# Patient Record
Sex: FEMALE | Race: WHITE | URBAN - METROPOLITAN AREA
[De-identification: names, ages, dates, MRNs, and addresses within clinical notes are randomized per-mention and may not be internally consistent; named-entity substitution may affect disease eponyms.]

---

## 2022-09-08 ENCOUNTER — WEB ENCOUNTER (OUTPATIENT)
Dept: URBAN - METROPOLITAN AREA CLINIC 128 | Facility: CLINIC | Age: 47
End: 2022-09-08

## 2022-09-08 ENCOUNTER — OFFICE VISIT (OUTPATIENT)
Dept: URBAN - METROPOLITAN AREA CLINIC 128 | Facility: CLINIC | Age: 47
End: 2022-09-08
Payer: COMMERCIAL

## 2022-09-08 VITALS
WEIGHT: 132 LBS | SYSTOLIC BLOOD PRESSURE: 108 MMHG | HEART RATE: 59 BPM | TEMPERATURE: 97.9 F | DIASTOLIC BLOOD PRESSURE: 68 MMHG | BODY MASS INDEX: 20.72 KG/M2 | HEIGHT: 67 IN

## 2022-09-08 DIAGNOSIS — K64.9 HEMORRHOIDS: ICD-10-CM

## 2022-09-08 DIAGNOSIS — K58.9 IBS (IRRITABLE BOWEL SYNDROME): ICD-10-CM

## 2022-09-08 DIAGNOSIS — Z12.11 COLON CANCER SCREENING: ICD-10-CM

## 2022-09-08 PROCEDURE — 99203 OFFICE O/P NEW LOW 30 MIN: CPT | Performed by: INTERNAL MEDICINE

## 2022-09-08 RX ORDER — POLYETHYLENE GLYOCOL 3350, SODIUM CHLORIDE, SODIUM BICARBONATE AND POTASSIUM CHLORIDE 420; 11.2; 5.72; 1.48 G/4L; G/4L; G/4L; G/4L
AS DIRECTED POWDER, FOR SOLUTION NASOGASTRIC; ORAL
Qty: 1 | Refills: 0 | OUTPATIENT
Start: 2022-09-08 | End: 2022-09-09

## 2022-09-08 NOTE — HPI-TODAY'S VISIT:
patient presents for initial colonoscopy. in midst of w/u for chest pain palpitations, abnormal ekg with cards. will request cards clearance. reports h/o hemorhoids with intermittent bleeding constipation diarrhea and gas pains over past couple of years. admits lots of stress. .

## 2022-09-30 ENCOUNTER — OFFICE VISIT (OUTPATIENT)
Dept: URBAN - METROPOLITAN AREA SURGERY CENTER 31 | Facility: SURGERY CENTER | Age: 47
End: 2022-09-30

## 2022-10-14 ENCOUNTER — OFFICE VISIT (OUTPATIENT)
Dept: URBAN - METROPOLITAN AREA SURGERY CENTER 31 | Facility: SURGERY CENTER | Age: 47
End: 2022-10-14
Payer: COMMERCIAL

## 2022-10-14 ENCOUNTER — WEB ENCOUNTER (OUTPATIENT)
Age: 47
End: 2022-10-14

## 2022-10-14 DIAGNOSIS — Z12.11 COLON CANCER SCREENING: ICD-10-CM

## 2022-10-14 PROCEDURE — 45378 DIAGNOSTIC COLONOSCOPY: CPT | Performed by: INTERNAL MEDICINE

## 2022-10-14 PROCEDURE — G8907 PT DOC NO EVENTS ON DISCHARG: HCPCS | Performed by: INTERNAL MEDICINE

## 2022-10-14 RX ORDER — HYDROCORTISONE 25 MG/G
1 APPLICATION CREAM TOPICAL TWICE A DAY
Qty: 42 APPLICATOR | Refills: 0 | OUTPATIENT
Start: 2022-10-14

## 2022-10-24 ENCOUNTER — TELEPHONE ENCOUNTER (OUTPATIENT)
Dept: URBAN - METROPOLITAN AREA CLINIC 128 | Facility: CLINIC | Age: 47
End: 2022-10-24

## 2023-01-13 ENCOUNTER — OFFICE VISIT (OUTPATIENT)
Dept: URBAN - METROPOLITAN AREA CLINIC 128 | Facility: CLINIC | Age: 48
End: 2023-01-13

## 2023-02-23 ENCOUNTER — WEB ENCOUNTER (OUTPATIENT)
Dept: URBAN - METROPOLITAN AREA CLINIC 128 | Facility: CLINIC | Age: 48
End: 2023-02-23

## 2023-02-24 ENCOUNTER — OFFICE VISIT (OUTPATIENT)
Dept: URBAN - METROPOLITAN AREA CLINIC 128 | Facility: CLINIC | Age: 48
End: 2023-02-24
Payer: COMMERCIAL

## 2023-02-24 ENCOUNTER — DASHBOARD ENCOUNTERS (OUTPATIENT)
Age: 48
End: 2023-02-24

## 2023-02-24 VITALS
HEART RATE: 61 BPM | HEIGHT: 67 IN | WEIGHT: 132 LBS | TEMPERATURE: 98.2 F | DIASTOLIC BLOOD PRESSURE: 78 MMHG | BODY MASS INDEX: 20.72 KG/M2 | SYSTOLIC BLOOD PRESSURE: 113 MMHG

## 2023-02-24 DIAGNOSIS — R19.7 DIARRHEA: ICD-10-CM

## 2023-02-24 DIAGNOSIS — K64.9 HEMORRHOIDS: ICD-10-CM

## 2023-02-24 DIAGNOSIS — K58.9 IBS (IRRITABLE BOWEL SYNDROME): ICD-10-CM

## 2023-02-24 PROBLEM — 10743008 IRRITABLE BOWEL SYNDROME: Status: ACTIVE | Noted: 2022-09-08

## 2023-02-24 PROCEDURE — 99214 OFFICE O/P EST MOD 30 MIN: CPT | Performed by: INTERNAL MEDICINE

## 2023-02-24 RX ORDER — RIFAXIMIN 550 MG/1
1 TABLET TABLET ORAL THREE TIMES A DAY
Qty: 42 TABLET | Refills: 0 | OUTPATIENT
Start: 2023-02-24 | End: 2023-03-10

## 2023-02-24 NOTE — PHYSICAL EXAM GASTROINTESTINAL
Abdomen: soft nt non-distended. No hepatosplenomegaly. no guarding or rebound. Rectal: defered.
fair balance

## 2023-02-24 NOTE — HPI-TODAY'S VISIT:
f/u after colon for ibs. notes gurgling d bloating and gas after eating. wondering if lactose intolerant. never checked for celiac. need to see [facility] for hemorrhoids.

## 2023-03-08 ENCOUNTER — LAB OUTSIDE AN ENCOUNTER (OUTPATIENT)
Dept: URBAN - METROPOLITAN AREA CLINIC 128 | Facility: CLINIC | Age: 48
End: 2023-03-08

## 2023-03-08 LAB
IMMUNOGLOBULIN A: 107
INTERPRETATION: (no result)
TISSUE TRANSGLUTAMINASE AB, IGA: <1

## 2023-08-24 ENCOUNTER — OFFICE VISIT (OUTPATIENT)
Dept: URBAN - METROPOLITAN AREA CLINIC 128 | Facility: CLINIC | Age: 48
End: 2023-08-24